# Patient Record
Sex: MALE | Race: AMERICAN INDIAN OR ALASKA NATIVE
[De-identification: names, ages, dates, MRNs, and addresses within clinical notes are randomized per-mention and may not be internally consistent; named-entity substitution may affect disease eponyms.]

---

## 2022-06-10 ENCOUNTER — HOSPITAL ENCOUNTER (EMERGENCY)
Dept: HOSPITAL 5 - ED | Age: 32
Discharge: LEFT BEFORE DISCHARGE | End: 2022-06-10
Payer: SELF-PAY

## 2022-06-10 VITALS — SYSTOLIC BLOOD PRESSURE: 190 MMHG | DIASTOLIC BLOOD PRESSURE: 112 MMHG

## 2022-06-10 DIAGNOSIS — Z53.21: ICD-10-CM

## 2022-06-10 DIAGNOSIS — R06.02: Primary | ICD-10-CM

## 2022-06-10 PROCEDURE — 71046 X-RAY EXAM CHEST 2 VIEWS: CPT

## 2022-06-10 PROCEDURE — 93005 ELECTROCARDIOGRAM TRACING: CPT

## 2022-06-10 NOTE — XRAY REPORT
CHEST 2 VIEWS 



INDICATION / CLINICAL INFORMATION: SOB.



COMPARISON: None available.



FINDINGS:



SUPPORT DEVICES: None.

HEART / MEDIASTINUM: No significant abnormality. 

LUNGS / PLEURA: No significant pulmonary or pleural abnormality. No pneumothorax. 



ADDITIONAL FINDINGS: No significant additional findings.



IMPRESSION:

1. No acute findings.



Signer Name: CARLOS Strong MD 

Signed: 6/10/2022 4:17 PM

Workstation Name: [id number]

## 2022-06-12 NOTE — ELECTROCARDIOGRAPH REPORT
[facility]

                                       

Test Date:    2022-06-10               Test Time:    15:44:12

Pat Name:     YARELI LOUIS              Department:   

Patient ID:   [id number]          Room:          

Gender:       M                        Technician:   RODNEY

:          1990               Requested By: MARNIE NAYLOR III

Order Number: [id number]              Reading MD:   Gallito Kunz

                                 Measurements

Intervals                              Axis          

Rate:         70                       P:            0

MT:           170                      QRS:          26

QRSD:         107                      T:            37

QT:           389                                    

QTc:          420                                    

                           Interpretive Statements

Sinus rhythm

Normal ECG

No previous ECG available for comparison

Electronically Signed On 2022 21:26:34 EDT by Gallito Kunz